# Patient Record
Sex: FEMALE | Race: WHITE | NOT HISPANIC OR LATINO | URBAN - METROPOLITAN AREA
[De-identification: names, ages, dates, MRNs, and addresses within clinical notes are randomized per-mention and may not be internally consistent; named-entity substitution may affect disease eponyms.]

---

## 2017-03-17 ENCOUNTER — APPOINTMENT (RX ONLY)
Dept: URBAN - METROPOLITAN AREA OTHER 9 | Facility: OTHER | Age: 32
Setting detail: DERMATOLOGY
End: 2017-03-17

## 2017-03-17 DIAGNOSIS — L20.89 OTHER ATOPIC DERMATITIS: ICD-10-CM

## 2017-03-17 DIAGNOSIS — D22 MELANOCYTIC NEVI: ICD-10-CM

## 2017-03-17 DIAGNOSIS — D485 NEOPLASM OF UNCERTAIN BEHAVIOR OF SKIN: ICD-10-CM

## 2017-03-17 PROBLEM — L30.9 DERMATITIS, UNSPECIFIED: Status: ACTIVE | Noted: 2017-03-17

## 2017-03-17 PROBLEM — L20.84 INTRINSIC (ALLERGIC) ECZEMA: Status: ACTIVE | Noted: 2017-03-17

## 2017-03-17 PROBLEM — D22.5 MELANOCYTIC NEVI OF TRUNK: Status: ACTIVE | Noted: 2017-03-17

## 2017-03-17 PROBLEM — D48.5 NEOPLASM OF UNCERTAIN BEHAVIOR OF SKIN: Status: ACTIVE | Noted: 2017-03-17

## 2017-03-17 PROCEDURE — ? TREATMENT REGIMEN

## 2017-03-17 PROCEDURE — 99203 OFFICE O/P NEW LOW 30 MIN: CPT

## 2017-03-17 PROCEDURE — ? DEFER

## 2017-03-17 PROCEDURE — ? OTHER

## 2017-03-17 PROCEDURE — ? COUNSELING

## 2017-03-17 ASSESSMENT — LOCATION DETAILED DESCRIPTION DERM
LOCATION DETAILED: LEFT DORSAL MIDDLE METACARPOPHALANGEAL JOINT
LOCATION DETAILED: LEFT INFERIOR LATERAL MIDBACK
LOCATION DETAILED: INFERIOR THORACIC SPINE

## 2017-03-17 ASSESSMENT — LOCATION SIMPLE DESCRIPTION DERM
LOCATION SIMPLE: UPPER BACK
LOCATION SIMPLE: LEFT LOWER BACK
LOCATION SIMPLE: LEFT HAND

## 2017-03-17 ASSESSMENT — LOCATION ZONE DERM
LOCATION ZONE: HAND
LOCATION ZONE: TRUNK

## 2017-03-17 NOTE — PROCEDURE: DEFER
Scheduling Instructions (Optional): Defer biopsy till after pregnancy
Detail Level: Simple
Instructions (Optional): Left lateral back

## 2017-03-17 NOTE — PROCEDURE: OTHER
Detail Level: Simple
Note Text (......Xxx Chief Complaint.): This diagnosis correlates with the
Other (Free Text): Pt. Is advised to use coconut oil with the alternative to use Vaseline.  Also, pt. Is advised to wash with Dove Sensitive Skin soap.

## 2017-03-17 NOTE — HPI: BODY LOCATION - HAND
How Severe Is Your Condition?: moderate
Additional History: NEW pt. Is here for a couple lesions on her back.  Also, pt. Is very itchy at night.

## 2017-03-17 NOTE — PROCEDURE: TREATMENT REGIMEN
Discontinue Regimen: Neutrogena acne wash
Detail Level: Simple
Samples Given: Cerave ointment
Otc Regimen: Cerave healing ointment, cetaphil wash

## 2022-07-08 RX ORDER — RIZATRIPTAN BENZOATE 5 MG/1
TABLET ORAL
COMMUNITY